# Patient Record
Sex: FEMALE | Race: BLACK OR AFRICAN AMERICAN | NOT HISPANIC OR LATINO | URBAN - METROPOLITAN AREA
[De-identification: names, ages, dates, MRNs, and addresses within clinical notes are randomized per-mention and may not be internally consistent; named-entity substitution may affect disease eponyms.]

---

## 2024-09-04 ENCOUNTER — OFFICE VISIT (OUTPATIENT)
Dept: URBAN - METROPOLITAN AREA CLINIC 70 | Facility: CLINIC | Age: 89
End: 2024-09-04
Payer: MEDICARE

## 2024-09-04 ENCOUNTER — DASHBOARD ENCOUNTERS (OUTPATIENT)
Age: 89
End: 2024-09-04

## 2024-09-04 ENCOUNTER — LAB OUTSIDE AN ENCOUNTER (OUTPATIENT)
Dept: URBAN - METROPOLITAN AREA CLINIC 70 | Facility: CLINIC | Age: 89
End: 2024-09-04

## 2024-09-04 VITALS
SYSTOLIC BLOOD PRESSURE: 144 MMHG | HEART RATE: 88 BPM | WEIGHT: 172.2 LBS | BODY MASS INDEX: 31.69 KG/M2 | HEIGHT: 62 IN | TEMPERATURE: 98.1 F | DIASTOLIC BLOOD PRESSURE: 86 MMHG

## 2024-09-04 DIAGNOSIS — K59.09 CHRONIC CONSTIPATION: ICD-10-CM

## 2024-09-04 DIAGNOSIS — R10.31 RLQ ABDOMINAL PAIN: ICD-10-CM

## 2024-09-04 PROCEDURE — 99204 OFFICE O/P NEW MOD 45 MIN: CPT | Performed by: INTERNAL MEDICINE

## 2024-09-04 NOTE — HPI-TODAY'S VISIT:
89-year-old patient here with complaint of chronic constipation and right lower quadrant abdominal pain.  Past medical history significant for but not limited to diverticulosis, irritable bowel syndrome, arthritis, diabetes, seizures.  Patient reports that she is not able to have a bowel movement for 3 to 4 days, and when she takes over-the-counter store brand laxatives she experiences diarrhea.  Complaint of right lower quadrant pain, patient is unable to describe the characteristics of her pain.  Per patient her pain does not improve after taking laxatives or having a bowel movement.  Denies having any nausea, vomiting, melena, hematochezia, unintentional weight loss, lack of appetite.

## 2024-09-17 ENCOUNTER — TELEPHONE ENCOUNTER (OUTPATIENT)
Dept: URBAN - METROPOLITAN AREA CLINIC 70 | Facility: CLINIC | Age: 89
End: 2024-09-17